# Patient Record
Sex: FEMALE | Race: WHITE | NOT HISPANIC OR LATINO | Employment: OTHER
[De-identification: names, ages, dates, MRNs, and addresses within clinical notes are randomized per-mention and may not be internally consistent; named-entity substitution may affect disease eponyms.]

---

## 2022-10-04 ENCOUNTER — TRANSCRIBE ORDERS (OUTPATIENT)
Dept: OTHER | Age: 72
End: 2022-10-04

## 2022-10-04 DIAGNOSIS — J39.8 TRACHEOBRONCHOMALACIA: Primary | ICD-10-CM

## 2022-10-14 ENCOUNTER — OFFICE VISIT (OUTPATIENT)
Dept: PULMONOLOGY | Facility: OTHER | Age: 72
End: 2022-10-14
Payer: COMMERCIAL

## 2022-10-14 ENCOUNTER — PREP FOR PROCEDURE (OUTPATIENT)
Dept: MULTI SPECIALTY CLINIC | Facility: CLINIC | Age: 72
End: 2022-10-14

## 2022-10-14 VITALS
WEIGHT: 210 LBS | DIASTOLIC BLOOD PRESSURE: 80 MMHG | BODY MASS INDEX: 35.85 KG/M2 | OXYGEN SATURATION: 97 % | SYSTOLIC BLOOD PRESSURE: 120 MMHG | HEART RATE: 101 BPM | HEIGHT: 64 IN

## 2022-10-14 DIAGNOSIS — J20.8 ACUTE BRONCHITIS DUE TO OTHER SPECIFIED ORGANISMS: ICD-10-CM

## 2022-10-14 DIAGNOSIS — R13.14 PHARYNGOESOPHAGEAL DYSPHAGIA: ICD-10-CM

## 2022-10-14 DIAGNOSIS — J39.8 TRACHEOBRONCHOMALACIA: Primary | ICD-10-CM

## 2022-10-14 PROCEDURE — 99205 OFFICE O/P NEW HI 60 MIN: CPT | Performed by: INTERNAL MEDICINE

## 2022-10-14 RX ORDER — IPRATROPIUM BROMIDE 42 UG/1
SPRAY, METERED NASAL
COMMUNITY
Start: 2021-09-07

## 2022-10-14 RX ORDER — PREDNISONE 20 MG/1
20 TABLET ORAL DAILY
Qty: 7 TABLET | Refills: 1 | Status: SHIPPED | OUTPATIENT
Start: 2022-10-14 | End: 2022-10-14

## 2022-10-14 RX ORDER — DULOXETIN HYDROCHLORIDE 30 MG/1
CAPSULE, DELAYED RELEASE ORAL
COMMUNITY
Start: 2022-07-06

## 2022-10-14 RX ORDER — SIMVASTATIN 40 MG
TABLET ORAL
COMMUNITY
Start: 2022-09-09

## 2022-10-14 RX ORDER — LISINOPRIL 10 MG/1
10 TABLET ORAL DAILY
COMMUNITY
Start: 2022-02-07

## 2022-10-14 RX ORDER — LIDOCAINE 40 MG/G
CREAM TOPICAL
Status: CANCELLED | OUTPATIENT
Start: 2022-10-14

## 2022-10-14 RX ORDER — APIXABAN 5 MG/1
5 TABLET, FILM COATED ORAL 2 TIMES DAILY
COMMUNITY
Start: 2022-09-16

## 2022-10-14 RX ORDER — ALBUTEROL SULFATE 90 UG/1
2 AEROSOL, METERED RESPIRATORY (INHALATION)
COMMUNITY
Start: 2022-03-02

## 2022-10-14 RX ORDER — LEVOTHYROXINE SODIUM 175 UG/1
TABLET ORAL
COMMUNITY
Start: 2022-10-13

## 2022-10-14 RX ORDER — FLUTICASONE PROPIONATE AND SALMETEROL XINAFOATE 115; 21 UG/1; UG/1
AEROSOL, METERED RESPIRATORY (INHALATION)
COMMUNITY
Start: 2022-06-02 | End: 2022-12-28

## 2022-10-14 RX ORDER — DULOXETIN HYDROCHLORIDE 60 MG/1
CAPSULE, DELAYED RELEASE ORAL
COMMUNITY
Start: 2022-09-16

## 2022-10-14 RX ORDER — SULFAMETHOXAZOLE/TRIMETHOPRIM 800-160 MG
TABLET ORAL
COMMUNITY
Start: 2022-03-01

## 2022-10-14 RX ORDER — FLUTICASONE PROPIONATE AND SALMETEROL XINAFOATE 230; 21 UG/1; UG/1
AEROSOL, METERED RESPIRATORY (INHALATION)
COMMUNITY
Start: 2022-04-08 | End: 2022-12-28

## 2022-10-14 RX ORDER — ALBUTEROL SULFATE 90 UG/1
AEROSOL, METERED RESPIRATORY (INHALATION)
COMMUNITY
Start: 2022-04-08

## 2022-10-14 RX ORDER — CIPROFLOXACIN 500 MG/1
500 TABLET, FILM COATED ORAL 2 TIMES DAILY
COMMUNITY
Start: 2022-09-25

## 2022-10-14 RX ORDER — LEVOTHYROXINE SODIUM 150 UG/1
TABLET ORAL
COMMUNITY
Start: 2022-09-13

## 2022-10-14 RX ORDER — ESTRADIOL 0.1 MG/G
CREAM VAGINAL
COMMUNITY
Start: 2022-01-06

## 2022-10-14 RX ORDER — COVID-19 MOLECULAR TEST ASSAY
KIT MISCELLANEOUS
COMMUNITY
Start: 2022-07-27

## 2022-10-14 RX ORDER — METOPROLOL TARTRATE 25 MG/1
25 TABLET, FILM COATED ORAL 2 TIMES DAILY
COMMUNITY
Start: 2022-09-21

## 2022-10-14 RX ORDER — DULOXETIN HYDROCHLORIDE 60 MG/1
60 CAPSULE, DELAYED RELEASE ORAL
COMMUNITY
Start: 2021-10-22 | End: 2022-10-22

## 2022-10-14 RX ORDER — LEVOTHYROXINE SODIUM 137 UG/1
TABLET ORAL
COMMUNITY
Start: 2022-08-11

## 2022-10-14 RX ORDER — CEFDINIR 300 MG/1
CAPSULE ORAL
COMMUNITY
Start: 2022-03-24

## 2022-10-14 RX ORDER — DULOXETIN HYDROCHLORIDE 30 MG/1
30 CAPSULE, DELAYED RELEASE ORAL
COMMUNITY
Start: 2021-10-22 | End: 2022-10-22

## 2022-10-14 RX ORDER — LEVOTHYROXINE SODIUM 175 UG/1
112 TABLET ORAL
COMMUNITY
Start: 2022-10-12 | End: 2023-10-12

## 2022-10-14 RX ORDER — PREDNISONE 20 MG/1
20 TABLET ORAL DAILY
Qty: 7 TABLET | Refills: 3 | Status: SHIPPED | OUTPATIENT
Start: 2022-10-14

## 2022-10-14 RX ORDER — IPRATROPIUM BROMIDE AND ALBUTEROL SULFATE 2.5; .5 MG/3ML; MG/3ML
SOLUTION RESPIRATORY (INHALATION)
COMMUNITY
Start: 2022-05-29

## 2022-10-14 NOTE — PATIENT INSTRUCTIONS
We will give you a short course of prednisone.    I recommend you discuss diuretics with your cardiologist or primary care doctor.    We will set you up for a bronchoscopy to inspect your airways.    [phone] is a number to call with any questions.

## 2022-10-14 NOTE — PROGRESS NOTES
LUNG NODULE & INTERVENTIONAL PULMONARY CLINIC  CLINICS & SURGERY CENTER, [facility]     Ana Garland MRN# [id number]   Age: 72 year old YOB: 1950       Requesting Physician: No referring provider defined for this encounter.       Assessment and Plan:    1.  Shortness of breath with high likelihood of tracheobronchomalacia.  Treat comorbidities as below.  Inspection bronchoscopy.  Will discuss repeat dynamic CT with thoracic surgery.    Continue acid reflux medications.    Video swallow evaluation for dysphagia.      We will reevaluate vocal cords during bronchoscopy.      More than 60 minutes spent on this visit during this calendar day.  This includes an extended amount of time in face-to-face discussion with the patient and her family, and also includes discussion with thoracic surgery, review of outside records and documentation.           History:     Ana Garland is a 72 year old female with sig h/o for hip arthritis, reflux who is here for evaluation/followup of shortness of breath and possible tracheobronchomalacia.  She does not have a history of bronchitis or COPD or asthma in the past.  She began having more issues in the last 6 to 12 months.  She saw an asthma specialist who did a dynamic CT which found some narrowing.  She is here to discuss treatment.    She has obstructive sleep apnea and wears her CPAP.  She tolerates this well and is expanding into use in the daytime.    She has significant hip arthritis that limits her activity.    She has some reflux for which she takes omeprazole.  She has occasional but difficult to predict pharyngeal esophageal phase dysphagia.  She occasionally has to retch food up that gets stuck.  It seems to be up in the neck and not down in the chest.    She does not have a lot of postnasal drip although she does have some voice hoarseness and inspiratory stridor occasionally.  She had an ENT laryngoscopy in  the last month or so that was normal.      - My interpretation of the images relevant for this visit includes: Seemingly some narrowing of the trachea but not in the most typical tracheobronchomalacia pattern.    - My interpretation of the PFT's relevant for this visit includes: Nondiagnostic-unremarkable           Past Medical History:   Hip arthritis  Shortness of breath       Past Surgical History:    No past surgical history on file.       Social History:     Social History     Tobacco Use     Smoking status: Former     Types: Cigarettes     Start date: 9/3/1968     Quit date: 10/23/1990     Years since quittin.9     Smokeless tobacco: Never   Substance Use Topics     Alcohol use: Not on file          Family History:   No family history on file.        Allergies:    No Known Allergies       Medications:     Current Outpatient Medications   Medication Sig     ADVAIR -21 MCG/ACT inhaler INHALE 2 PUFFS BY MOUTH TWICE DAILY RINSE MOUTH AFTER USE TO REDUCE AFTERTASTE AND INCIDENCE OF CANDIDIASIS. DO NOT SWALLOW.     ADVAIR -21 MCG/ACT inhaler INHALE 2 PUFFS BY MOUTH TWICE DAILY RINSE MOUTH WITH WATER AFTER USE TO REDUCE AFTERTASTE AND INCIDENCE OF CANDIDIASIS. DO NOT SWALLOW     albuterol (PROAIR HFA/PROVENTIL HFA/VENTOLIN HFA) 108 (90 Base) MCG/ACT inhaler Inhale 2 puffs into the lungs     albuterol (PROAIR HFA/PROVENTIL HFA/VENTOLIN HFA) 108 (90 Base) MCG/ACT inhaler INHALE 2 PUFFS BY MOUTH EVERY 4 HOURS AS NEEDED FOR SHORTNESS OF BREATH OR WHEEZING     apixaban ANTICOAGULANT (ELIQUIS) 5 MG tablet Take 5 mg by mouth     BINAXNOW COVID-19 AG HOME TEST KIT TEST AS DIRECTED TODAY     cefdinir (OMNICEF) 300 MG capsule TAKE 1 CAPSULE BY MOUTH TWICE DAILY FOR 7 DAYS     cholecalciferol (VITAMIN D3) 10 mcg (400 units) TABS tablet Take 800 Units by mouth     ciprofloxacin (CIPRO) 500 MG tablet Take 500 mg by mouth 2 times daily     DULoxetine (CYMBALTA) 30 MG capsule Take 30 mg by mouth     DULoxetine  "(CYMBALTA) 30 MG capsule TAKE 1 CAPSULE BY MOUTH TWICE DAILY . TAKE A 30 MG CAPSULE IN THE MORNING IN ADDITION TO A 60 MG CAPSULE IN THE EVENING.     DULoxetine (CYMBALTA) 60 MG capsule Take 60 mg by mouth     DULoxetine (CYMBALTA) 60 MG capsule TAKE 1 CAPSULE BY MOUTH ONCE DAILY IN THE EVENING     ELIQUIS ANTICOAGULANT 5 MG tablet Take 5 mg by mouth 2 times daily     estradiol (ESTRACE) 0.1 MG/GM vaginal cream INSERT 1 GRAM VAGINALLY 2 TIMES A WEEK     EUTHYROX 137 MCG tablet TAKE 1 TABLET BY MOUTH IN THE MORNING BEFORE BREAKFAST     EUTHYROX 150 MCG tablet      ipratropium (ATROVENT) 0.06 % nasal spray USE 2 SPRAY(S) IN EACH NOSTRIL TWICE DAILY     ipratropium - albuterol 0.5 mg/2.5 mg/3 mL (DUONEB) 0.5-2.5 (3) MG/3ML neb solution USE 3 ML IN NEBULIZER 4 TIMES DAILY AS NEEDED FOR WHEEZING FOR SHORTNESS OF BREATH     levothyroxine (SYNTHROID/LEVOTHROID) 175 MCG tablet Take 175 mcg by mouth     levothyroxine (SYNTHROID/LEVOTHROID) 175 MCG tablet      lisinopril (ZESTRIL) 10 MG tablet Take 10 mg by mouth daily     metoprolol tartrate (LOPRESSOR) 25 MG tablet Take 25 mg by mouth 2 times daily     omeprazole (PRILOSEC) 20 MG DR capsule Take 20 mg by mouth daily     predniSONE (DELTASONE) 20 MG tablet Take 1 tablet (20 mg) by mouth daily     simvastatin (ZOCOR) 40 MG tablet      sulfamethoxazole-trimethoprim (BACTRIM DS) 800-160 MG tablet TAKE 1 TABLET BY MOUTH EVERY 12 HOURS FOR 3 DAYS     No current facility-administered medications for this visit.          Review of Systems:     See HPI         Physical Exam:   /80 (BP Location: Right arm)   Pulse 101   Ht 1.626 m (5' 4\")   Wt 95.3 kg (210 lb)   SpO2 97%   BMI 36.05 kg/m      Constitutional - looks well, in no appar tired appearing but in good spirits ent distress  Eyes - no redness or discharge  Respiratory -increased work of breathing, inspiratory and some expiratory breath sounds, hoarse voice  Cardiac -- Normal rate, rhythm.   Skin - No appreciable " discoloration or lesions (very limited exam)  Neurological - No apparent tremors. Speech fluent and articlate  Psychiatric - no signs of delirium or anxiety          Current Laboratory Data:   All laboratory and imaging data reviewed.    No results found for this or any previous visit (from the past 24 hour(s)).

## 2022-10-19 ENCOUNTER — TELEPHONE (OUTPATIENT)
Dept: PULMONOLOGY | Facility: CLINIC | Age: 72
End: 2022-10-19

## 2022-10-19 NOTE — TELEPHONE ENCOUNTER
Spoke with daughter to schedule procedure with Dr. Bharati Ferrell   Procedure was scheduled on 11/4 at [facility]   Patient will have H&P with: not needed    Patient is aware a COVID-19 test is needed before their procedure.   Patient will have a home test due to outpatient status    Patient is aware a / is needed day of surgery.   Surgery letter was sent via e-mail, patient has my direct contact information for any further questions.

## 2022-10-23 ENCOUNTER — PATIENT OUTREACH (OUTPATIENT)
Dept: ONCOLOGY | Facility: CLINIC | Age: 72
End: 2022-10-23

## 2022-10-24 NOTE — PROGRESS NOTES
Late entry from 10/19/2022:  Received message from Ashley, surgery scheduler with Dr Ferrell stating pt scheduled for Bronchoscopy on 11/4/2022 at [facility] and pt's daughter Mary has questions regarding medication. Writer notified pt as consent to communicate not listed in chart. Received verbal okay from pt for writer to contact daughter Mary to discuss appointments and medical care.  Called Mary and she states pt is on a blood thinner, Eliquis for A Fib and would like to know if pt has to HOLD this medication prior to the procedure.    Dr Ferrell notified, see recommendations as noted below:    Bharati Ferrell MD Crookes, Valerie, RN  Cc: Aviva José RN  2 full days prior to the procedure please (last dose Tuesday before Friday procedure)    Daughter Mary called back and instructed as noted per Dr Ferrell about when to hold Eliquis and daughter was able to read back instructions.  Will need to complete  Bronchoscopy education and daughter states she is having surgery early next week but is able to review instructions on Wednesday, 10/26. Mary asked if writer can contact her first and then she will conference pt in on the call.  Mary states pt has difficulty recalling information thus easier for daughter to be a part of all calls. Informed daughter that FAWAD Chicas CC or writer will call back with instructions on 10/26/2022 and Mary verbalized understanding of all instructions.     Will notify FAWAD Chicas Care Coordinator with update.  Will pre procedure H&P be needed?    Debra Roman RN, BSN, OCN

## 2022-10-25 NOTE — PROGRESS NOTES
Writer called and discussed pre-bronchoscopy education with Ana and Mary (daughter) for pt's upcoming bronch with Dr. Ferrell 11/4 at [facility]. Writer reviewed the following instructions with Ana and Mary.    Pre-op Bronchoscopy Instructions    You have been scheduled on for BRONCHOSCOPY, inspection with possible endobronchial biopsy  with Dr. Ferrell at [facility] surgery center in [place]. Your procedure is scheduled on Friday November 7, 2022 with an OR start time of 12 pm. Please arrive to [facility] at 10 am (2 hours before OR start time). You will receive a call from the pre-admissions nurse the day before your procedure to confirm your arrival time.    [facility] is located at:   [address] [place] [address]    You can enter through the Surgery entrance of [facility] which is around the left side of the building if you are looking at the Main Entrance. You will see a sign for Surgery on the outside of the building. There is a surface parking lot that is free parking.    Eating and Drinking Guidelines  Discontinue all food and milk products 8 hours prior to the ARRIVAL time. You can continue clear liquids up until 2 hour prior to the ARRIVAL time. Clear liquids include water, 7Up, black coffee with no creamer/sugar, apple juice.    Medication Instructions    If you take aspirin you need to stop 7 days prior. If you are on a different blood thinner you may need to stop it up to a week before your exam. Your doctor will explain the best way to do this. Per Dr. Ferrell, Ana should stop her Eliquis 2 days prior to the procedure.    Stop taking Advil (ibuprofen), Aleve, NSAIDs 5 days before your procedure. Ok to take Tylenol    If you have diabetes, do not take insulin or other diabetes medications on the morning of the procedure (unless your doctor tells you to).     Pre-procedure COVID test  For same day procedures now [facility] is allowing patients to do a take  home COVID test 1-2 days before the procedure. If you are positive please call the pre-admission nurses right away to notify them of your results - [phone]; and do not come to your procedure. If you are negative please take a picture of the results and bring that picture with you to the hospital.      is required - You will need to arrange for a responsible caregiver to drive you home afterwards and stay with you for 24 hours. Mary (daughter) reports she and Ana's granddaughter will be able to drive Ana afterwards and will be staying with her.      Overview of the bronch    The bronch will take approximately one hour. Samples will be sent for testing at the pathology lab. The results take approximately one to two weeks to finalize.      After the procedure    You may feel tired or dizzy after the procedure. When you feel ready, slowly return to your regular activities.     You will need a responsible caregiver to drive you home after the procedure and stay with you for 24 hours afterwards. You will not be allowed to take public transportation (taxi, Uber, bus) unless you are accompanied with a responsible caregiver.    Restart all of your previous medications as directed by your doctor      24 Hour Restrictions after Bronchoscopy (due to the side effects of sedation)    Do not drive or operate any machinery    Do not drink alcohol    Do not make important decisions or sign contracts/legal documents.       Common side effects after bronch include:     Low grade fever in the evening after your bronchscopy    If you develop a fever higher than 100.4 degrees you may take Tylenol. If your fever lasts more than 24 hours, call your doctor.    Sore throat - take throat lozenges as needed    Drink plenty of fluids    Rest your voice for a day    Smoking may increase throat irritation    Coughing   o Small amounts of phlegm are common for 24 hours after the procedure  o Sometimes increased coughing occurring  beyond 24 hours after the procedure can happen      Side effects requiring notification of your doctor's team    IV site redness or drainage    Bleeding (more than a teaspoon) from your nose or throat    Pain    Fever    Difficulty or inability to resume eating or drinking    Ongoing cough      Emergency Precautions  Go to the nearest ED if you have any of the following and be sure to tell them you had a bronch:     Difficulty breathing    Chest pain or discomfort    Severe throat pain or neck swelling    Severe dizziness, weakness, or a fast heart rate    Large amount of bleeding from your nose or throat    If you have any questions or concerns about this procedure, please call Dr. Ferrell's clinic at [phone]    Mary and Ana verbalized understanding and procedure instructions were emailed to them to the email on file.     BENNIE Olivo, JAKN, RN, LAc, OCN  RN Care Coordinator  [facility]  Ph: [phone]  F: [phone]

## 2022-11-01 ENCOUNTER — HOSPITAL ENCOUNTER (OUTPATIENT)
Dept: SPEECH THERAPY | Facility: CLINIC | Age: 72
Discharge: HOME | End: 2022-11-01
Attending: INTERNAL MEDICINE
Payer: COMMERCIAL

## 2022-11-01 ENCOUNTER — HOSPITAL ENCOUNTER (OUTPATIENT)
Dept: GENERAL RADIOLOGY | Facility: CLINIC | Age: 72
Discharge: HOME | End: 2022-11-01
Attending: INTERNAL MEDICINE
Payer: COMMERCIAL

## 2022-11-01 DIAGNOSIS — R13.14 PHARYNGOESOPHAGEAL DYSPHAGIA: ICD-10-CM

## 2022-11-01 PROCEDURE — 74230 X-RAY XM SWLNG FUNCJ C+: CPT

## 2022-11-01 PROCEDURE — 92611 MOTION FLUOROSCOPY/SWALLOW: CPT | Mod: GN

## 2022-11-01 RX ORDER — BARIUM SULFATE 400 MG/ML
SUSPENSION ORAL ONCE
Status: DISCONTINUED | OUTPATIENT
Start: 2022-11-01 | End: 2022-11-01 | Stop reason: CLARIF

## 2022-11-01 NOTE — PROGRESS NOTES
"Video Fluoroscopic Swallow Study (VFSS)     11/01/22 1044   General Information   Type Of Visit Initial   Start Of Care Date 11/01/22   Referring Physician Tre Decker MD   Orders Evaluate And Treat   Medical Diagnosis pharyngoesophageal dysphagia (R13.14)   Onset Of Illness/injury Or Date Of Surgery 10/17/22  (order date)   Pertinent History of Current Problem/OT: Additional Occupational Profile Info   Referral from Dr. Dceker from [facility] for a video fluoroscopic swallow study given pt reports of food sticking/ retching it back up. Pt reports this has occured 4-5x within the last 6 months and has occured with hamburger/bun and shrimp fried rice. During these instances water/liquid does not help push it down and she forces herself to cough/retch it back up. She is unsure if she feels like liquids go down the wrong pipe when asked, if they do it is not often. She did have a VFSS on 12/4/2012 per care everywhere - radiologist's report states flash laryngeal penetration with thin liquids, WFL swallow with solids.     Per referring provider: She has persistent SOB and a recent dynamic CT which found some narrowing of her trachea. \"She does not have a lot of postnasal drip although she does have some voice hoarseness and inspiratory stridor occasionally.  She had an ENT laryngoscopy in the last month or so that was normal.\" He thinks \"high liklihood of tracheobronchomalacia\" and she has a brochoscopy appointment for this Friday, 11/4/22, for further assessment.    Her PMHx includes: asthma, COPD, GERD (takes omeprozole which does reduce her reflux + belching symptoms per her report).      Respiratory Status Room air   Prior Level Of Function Swallowing   Prior Level Of Function Comment Regular/thin diet   General Observations Pt in wheelchair, SOB and wheezing/stridor noted, reduced respiratory/speech coordination, hoarse vocal quality   Patient/family " Goals To fix her voice and get better   Abuse Screen (yes response referral indicated)   Feels Unsafe at Home or Work/School no   Feels Threatened by Someone no   Does Anyone Try to Keep You From Having Contact with Others or Doing Things Outside Your Home? no   Physical Signs of Abuse Present no   VFSS Evaluation   VFSS Additional Documentation Yes   VFSS Eval: Radiology   Radiologist Dr. Burnette   Views Taken left lateral   Physical Location of Procedure [facility], Radiology Dept, Fluoroscopy Suite   VFSS Eval: Thin Liquid Texture Trial   Mode of Presentation, Thin Liquid cup;straw;self-fed   Order of Presentation 1, 2, 6   Preparatory Phase Poor bolus control   Oral Phase, Thin Liquid Premature pharyngeal entry   Pharyngeal Phase, Thin Liquid Delayed swallow reflex   Rosenbek's Penetration Aspiration Scale: Thin Liquid Trial Results 2 - contrast enters airway, remains above the vocal cords, no residue remains (penetration)   Diagnostic Statement trace before/during flash laryngeal penetration 2/2 premature bolus spillage to the pyriform sinuses & mild swallow delay/delay in laryngeal closure (though epiglottic inversion complete).   VFSS Eval: Puree Solid Texture Trial   Mode of Presentation, Puree spoon;self-fed   Order of Presentation 3   Preparatory Phase WFL   Oral Phase, Puree WFL   Pharyngeal Phase, Puree WFL   Rosenbek's Penetration Aspiration Scale: Puree Food Trial Results 1 - no aspiration, contrast does not enter airway   Diagnostic Statement WFL, no residuals   VFSS Eval: Regular Texture Trial (Solid)   Mode of Presentation self-fed   Order of Presentation 4, 5   Preparatory Phase WFL   Oral Phase WFL   Pharyngeal Phase WFL   Rosenbek's Penetration Aspiration Scale 1 - no aspiration, contrast does not enter airway   Diagnostic Statement WFL, no residuals   Esophageal Phase of Swallow   Patient reports or presents with symptoms of esophageal dysphagia Yes    Esophageal sweep performed during today s vidofluoroscopic exam  No   Esophageal comments Pt with significant SOB, difficulty with movement per her report therefore unable to complete esophageal sweep. Notable prominent cricopharyngeus (see image), which did not impede bolus transit into upper esophagus.   Swallow Eval: Clinical Impressions   Skilled Criteria for Therapy Intervention   (Evaluation only)   Functional Assessment Scale (FAS) 6   Dysphagia Outcome Severity Scale (CHAO) Level 6 - CHAO   Treatment Diagnosis minimal oropharyngeal dysphagia   Diet texture recommendations Regular diet;Thin liquids (level 0)   Demonstrates Need for Referral to Another Service   (?GI vs esophageal motility testing (e.g., esophagram?))   Anticipated Discharge Disposition home   Patient, family and/or staff in agreement with Plan of Care Yes   Clinical Impression Comments   Video fluoroscopic swallow study completed - pt currently presents with minimal oropharyngeal dysphagia with primary deficits appearing to be related to reduced swallow/respiratory coordination (very SOB after swallowing with baseline SOB/wheezing/stridor) and mild swallow delay.     Reduced oral control with thin liquids resulting in premature bolus spillage to the pyriform sinuses with thin liquids with mild delay; timely swallow initiation at the base of tongue with all solids. Piecemeal swallows with liquids. Base of tongue retraction minimally reduced, hyolaryngeal elevation/excursion WFL, epiglottic inversion complete. Upper esophageal sphincter relaxation/opening during the swallow appears function despite notable prominent cricopharyngeus (see image) -- no difficulty of bolus transfer into upper esophagus/ no pharyngeal residuals.         Trace before/during flash laryngeal penetration occuring with thin liquids only 2/2 premature bolus spillage to the pyriform sinuses & mild swallow delay/delay in laryngeal closure - this is c/w report dating back  to 2012 and flash laryngeal penetration is judged functional. No aspiration noted.     Education pt on results of VFSS including cine clip video review of oropharyngeal anatomy/physiology, current diet recommendations, strategies to improve swallow/respiratory coordination - pt verbalized understanding.    Recommendations:  1) regular solids, thin liquids with SLOW rate of intake, frequent rest breaks between bites/sips  2) consider further esophageal motility testing vs GI consult given symptoms of food sticking/retching up food, hx of reflux and prominent cricopharyngeus.   3) consider OP SLP for video stroboscopy assessment with potential voice intervention     Total Session Time   SLP Eval: VideoFluoroscopic Swallow function Minutes ([id number]) 88

## 2022-11-04 ENCOUNTER — HOSPITAL ENCOUNTER (OUTPATIENT)
Facility: CLINIC | Age: 72
Discharge: HOME | End: 2022-11-04
Attending: INTERNAL MEDICINE | Admitting: INTERNAL MEDICINE
Payer: COMMERCIAL

## 2022-11-04 VITALS
RESPIRATION RATE: 15 BRPM | OXYGEN SATURATION: 100 % | HEART RATE: 71 BPM | SYSTOLIC BLOOD PRESSURE: 88 MMHG | DIASTOLIC BLOOD PRESSURE: 76 MMHG | TEMPERATURE: 97.2 F

## 2022-11-04 DIAGNOSIS — J39.8 TRACHEOBRONCHOMALACIA: ICD-10-CM

## 2022-11-04 PROCEDURE — 31622 DX BRONCHOSCOPE/WASH: CPT | Performed by: INTERNAL MEDICINE

## 2022-11-04 PROCEDURE — 250N000011 HC RX IP 250 OP 636: Performed by: INTERNAL MEDICINE

## 2022-11-04 PROCEDURE — 999N000099 HC STATISTIC MODERATE SEDATION < 10 MIN: Performed by: INTERNAL MEDICINE

## 2022-11-04 PROCEDURE — 250N000009 HC RX 250: Performed by: INTERNAL MEDICINE

## 2022-11-04 RX ORDER — NALOXONE HYDROCHLORIDE 0.4 MG/ML
0.4 INJECTION, SOLUTION INTRAMUSCULAR; INTRAVENOUS; SUBCUTANEOUS
Status: DISCONTINUED | OUTPATIENT
Start: 2022-11-04 | End: 2022-11-04 | Stop reason: HOSPADM

## 2022-11-04 RX ORDER — NALOXONE HYDROCHLORIDE 0.4 MG/ML
0.2 INJECTION, SOLUTION INTRAMUSCULAR; INTRAVENOUS; SUBCUTANEOUS
Status: DISCONTINUED | OUTPATIENT
Start: 2022-11-04 | End: 2022-11-04 | Stop reason: HOSPADM

## 2022-11-04 RX ORDER — LIDOCAINE 40 MG/G
CREAM TOPICAL
Status: DISCONTINUED | OUTPATIENT
Start: 2022-11-04 | End: 2022-11-04 | Stop reason: HOSPADM

## 2022-11-04 RX ORDER — ALBUTEROL SULFATE 0.83 MG/ML
SOLUTION RESPIRATORY (INHALATION) PRN
Status: DISCONTINUED | OUTPATIENT
Start: 2022-11-04 | End: 2022-11-04 | Stop reason: HOSPADM

## 2022-11-04 RX ORDER — FENTANYL CITRATE 0.05 MG/ML
50-100 INJECTION, SOLUTION INTRAMUSCULAR; INTRAVENOUS EVERY 5 MIN PRN
Status: DISCONTINUED | OUTPATIENT
Start: 2022-11-04 | End: 2022-11-04 | Stop reason: HOSPADM

## 2022-11-04 RX ORDER — LIDOCAINE HYDROCHLORIDE 20 MG/ML
INJECTION, SOLUTION INFILTRATION; PERINEURAL PRN
Status: DISCONTINUED | OUTPATIENT
Start: 2022-11-04 | End: 2022-11-04 | Stop reason: HOSPADM

## 2022-11-04 RX ORDER — FLUMAZENIL 0.1 MG/ML
0.2 INJECTION, SOLUTION INTRAVENOUS
Status: DISCONTINUED | OUTPATIENT
Start: 2022-11-04 | End: 2022-11-04 | Stop reason: HOSPADM

## 2022-11-04 RX ORDER — ONDANSETRON 4 MG/1
4 TABLET, ORALLY DISINTEGRATING ORAL EVERY 6 HOURS PRN
Status: DISCONTINUED | OUTPATIENT
Start: 2022-11-04 | End: 2022-11-04 | Stop reason: HOSPADM

## 2022-11-04 RX ORDER — ONDANSETRON 2 MG/ML
4 INJECTION INTRAMUSCULAR; INTRAVENOUS EVERY 6 HOURS PRN
Status: DISCONTINUED | OUTPATIENT
Start: 2022-11-04 | End: 2022-11-04 | Stop reason: HOSPADM

## 2022-11-04 RX ORDER — LIDOCAINE HYDROCHLORIDE 40 MG/ML
INJECTION, SOLUTION RETROBULBAR PRN
Status: DISCONTINUED | OUTPATIENT
Start: 2022-11-04 | End: 2022-11-04 | Stop reason: HOSPADM

## 2022-11-04 RX ORDER — LIDOCAINE HYDROCHLORIDE 10 MG/ML
INJECTION, SOLUTION INFILTRATION; PERINEURAL PRN
Status: DISCONTINUED | OUTPATIENT
Start: 2022-11-04 | End: 2022-11-04 | Stop reason: HOSPADM

## 2022-11-04 RX ADMIN — MIDAZOLAM HYDROCHLORIDE 1 MG: 1 INJECTION, SOLUTION INTRAMUSCULAR; INTRAVENOUS at 11:35

## 2022-11-04 RX ADMIN — FENTANYL CITRATE 50 MCG: 50 INJECTION INTRAMUSCULAR; INTRAVENOUS at 11:35

## 2022-11-04 ASSESSMENT — ACTIVITIES OF DAILY LIVING (ADL)
ADLS_ACUITY_SCORE: 35
ADLS_ACUITY_SCORE: 35

## 2022-11-04 NOTE — PROCEDURES
INTERVENTIONAL PULMONOLOGY       Procedure(s):    A flexible bronchoscopy  Airway exam    Indication:  Suspected tracheobronchomalacia    Attending of Record:  Bharati Ferrell MD     Interventional Pulmonary Fellow   None    Trainees Present:   None     Medications:    9 ml 4% lidocaine  6 ml 1% lidocaine  1 mg versed  50 mcg fentanyl    Sedation Time:   Total sedation time was Choose Duration: 9 minutes of continuous bedside 1:1 monitoring.    Time Out:  Performed    The patient's medical record has been reviewed.  The indication for the procedure was reviewed.  The necessary history and physical examination was performed and reviewed.  The risks, benefits and alternatives of the procedure were discussed with the the patient in detail and she had the opportunity to ask questions.  I discussed in particular the potential complications including risks of minor or life-threatening bleeding and/or infection, respiratory failure, vocal cord trauma / paralysis, pneumothorax, and discomfort. Sedation risks were also discussed including abnormal heart rhythms, low blood pressure, and respiratory failure. All questions were answered to the best of my ability.  Verbal and written informed consent was obtained.  The proposed procedure and the patient's identification were verified prior to the procedure by the physician and the nurse.    The patient was assessed for the adequacy for the procedure and to receive medications.   Mental Status:  Alert and oriented x 3  Airway examination:  Class IV (Soft palate is not visual, hard palate only)  Pulmonary:  Diffuse wheezing  CV:  RRR, no murmurs or gallops  ASA Grade:  (II)  Mild systemic disease    After clinical evaluation and reviewing the indication, risks, alternatives and benefits of the procedure the patient was deemed to be in satisfactory condition to undergo the procedure.      Immediately before administration of medications the patient was re-assessed for adequacy to  receive sedatives including the heart rate, respiratory rate, mental status, oxygen saturation, blood pressure and adequacy of pulmonary ventilation. These same parameters were continuously monitored throughout the procedure.    A Tuberculosis risk assessment was performed:  The patient has no known RISK of Tuberculosis    The procedure was performed in a negative airflow room: The patient could not be moved to a negative airflow room because of no risk of TB    Maneuvers / Procedure:      A Flexible  bronchoscope was used for the procedure. The rigid bronchoscope was inserted into the left nostril. Uvula, epiglottis and vocal cords were seen. The scope was advanced turning the bevel to 90degress while passing through the cords and into the trachea.   Airway Examination: A complete airway examination was performed from the distal trachea to the subsegmental level in each lobe of both lungs.  Pertinent findings include saber trachea with mild to moderate EDAC, some lateral contraction with breathing, 30% or less obstruction.     No TBM    Any disposable equipment was visually inspected and deemed to be intact immediately post procedure.      Relevant Pictures  Video obtained see this link: [email or web address]       Recommendations:     -->  Successful bronchoscopy with airway exam.   -->  Some excessive dynamic airway collapse seen, not TBM  --> I shared the results with her granddaughter Yvonne Ferrell MD  Associate Professor of Medicine  Section of Interventional Pulmonology   Division of Pulmonary, Allergy, Critical Care and Sleep Medicine   [facility]

## 2022-11-11 ENCOUNTER — TELEPHONE (OUTPATIENT)
Dept: PULMONOLOGY | Facility: CLINIC | Age: 72
End: 2022-11-11

## 2022-11-11 NOTE — TELEPHONE ENCOUNTER
Test Results    Contacts       Type Contact Phone/Fax    11/11/2022 09:22 AM CST Phone (Incoming) Mary Gee (Emergency Contact) [phone]          Who ordered the test:  Dr. Decker    Type of test: Bronchoscopy    Date of test:  11/04/2022    Where was the test performed:    [facility]    What are your questions/concerns?:  Would Like Provider to call with results     Okay to leave a detailed message?: Yes at Home number on file [phone] (home)

## 2022-11-21 ENCOUNTER — PREP FOR PROCEDURE (OUTPATIENT)
Dept: MULTI SPECIALTY CLINIC | Facility: CLINIC | Age: 72
End: 2022-11-21

## 2022-11-21 DIAGNOSIS — J39.8 TRACHEOBRONCHOMALACIA: Primary | ICD-10-CM

## 2022-12-05 ENCOUNTER — TELEPHONE (OUTPATIENT)
Dept: PULMONOLOGY | Facility: CLINIC | Age: 72
End: 2022-12-05

## 2022-12-05 NOTE — TELEPHONE ENCOUNTER
Spoke with daughter to schedule procedure with Tre Decker    Procedure was scheduled on 12/19 at [facility] OR  Patient will have H&P at [facility]     Patient is aware a COVID-19 test is needed before their procedure.   Patient will have a home test due to outpatient status    Patient is aware a / is needed day of surgery.   Surgery letter was sent via [employer], patient has my direct contact information for any further questions.     Letter sent to: [email or web address]

## 2022-12-05 NOTE — TELEPHONE ENCOUNTER
Called patient to schedule procedure with Tre Decker , there was no answer.  Left message with my direct line [phone].    Sx spot holding 12/19

## 2022-12-16 ENCOUNTER — ANESTHESIA EVENT (OUTPATIENT)
Dept: SURGERY | Facility: CLINIC | Age: 72
End: 2022-12-16
Payer: COMMERCIAL

## 2022-12-16 ASSESSMENT — COPD QUESTIONNAIRES
CAT_SEVERITY: MILD
COPD: 1

## 2022-12-16 NOTE — ANESTHESIA PREPROCEDURE EVALUATION
Anesthesia Pre-Procedure Evaluation    Patient: Ana Garland   MRN: [id number] : 1950        Procedure : Procedure(s):  BRONCHOSCOPY flexible for inspection with possible rigid bronchoscopy and stent placement          Past Medical History:   Diagnosis Date     Antiplatelet or antithrombotic long-term use      Bronchomalacia      Chronic kidney disease, stage III (moderate) (H)      Hypertension      Hypothyroidism      Paroxysmal atrial fibrillation (H)      Sleep apnea     CPAP      Past Surgical History:   Procedure Laterality Date     BRONCHOSCOPY FLEXIBLE AND RIGID N/A 2022    Procedure: BRONCHOSCOPY;  Surgeon: Bharati Ferrell MD;  Location: [facility]      No Known Allergies   Social History     Tobacco Use     Smoking status: Former     Types: Cigarettes     Start date: 9/3/1968     Quit date: 10/23/1990     Years since quittin.1     Smokeless tobacco: Never   Substance Use Topics     Alcohol use: Yes     Comment: rare      Wt Readings from Last 1 Encounters:   10/14/22 95.3 kg (210 lb)        Anesthesia Evaluation            ROS/MED HX  ENT/Pulmonary: Comment: Chronic cough    (+) sleep apnea, uses CPAP, asthma Treatment: Inhaled steroids and Inhaler prn,  mild,  COPD,     Neurologic:       Cardiovascular:     (+) hypertension-----Taking blood thinners ANGELES. Previous cardiac testing   Echo: Date: 2022 Results:   1. Normal LV size, normal wall thickness, normal global systolic function with an estimated EF of 55 - 60%.    2. Mildly enlarged left atrium.    3. Right ventricular cavity size is normal, global systolic RV function is borderline reduced.    4. Mildly increased estimated pulmonary pressures by tricuspid regurgitation velocity and right atrial pressure (39 mmHg plus RAP).     Stress Test: Date: 8/3/2021 Results:  Treadmill, Chuck protocol. No evidence of ischemia or RWMA.  ECG Reviewed: Date: Results:    Cath: Date: Results:      METS/Exercise Tolerance:     Hematologic:        Musculoskeletal:       GI/Hepatic:       Renal/Genitourinary:       Endo:     (+) thyroid problem, hypothyroidism, Obesity,     Psychiatric/Substance Use:       Infectious Disease:       Malignancy:       Other:               OUTSIDE LABS:  CBC: No results found for: WBC, HGB, HCT, PLT  BMP: No results found for: NA, POTASSIUM, CHLORIDE, CO2, BUN, CR, GLC  COAGS: No results found for: PTT, INR, FIBR  POC: No results found for: BGM, HCG, HCGS  HEPATIC: No results found for: ALBUMIN, PROTTOTAL, ALT, AST, GGT, ALKPHOS, BILITOTAL, BILIDIRECT, CARLA  OTHER: No results found for: PH, LACT, A1C, EDOUARD, PHOS, MAG, LIPASE, AMYLASE, TSH, T4, T3, CRP, SED    Anesthesia Plan    ASA Status:  3      Anesthesia Type: General.     - Airway: ETT   Induction: Intravenous.   Maintenance: TIVA.        Consents            Postoperative Care    Pain management: IV analgesics, Oral pain medications, Multi-modal analgesia.   PONV prophylaxis: Ondansetron (or other 5HT-3)     Comments:                Vinod Jacinto MD

## 2022-12-19 ENCOUNTER — ANESTHESIA (OUTPATIENT)
Dept: SURGERY | Facility: CLINIC | Age: 72
End: 2022-12-19
Payer: COMMERCIAL

## 2022-12-19 ENCOUNTER — HOSPITAL ENCOUNTER (OUTPATIENT)
Facility: CLINIC | Age: 72
Discharge: HOME | End: 2022-12-19
Attending: INTERNAL MEDICINE | Admitting: INTERNAL MEDICINE
Payer: COMMERCIAL

## 2022-12-19 VITALS
SYSTOLIC BLOOD PRESSURE: 119 MMHG | HEIGHT: 64 IN | OXYGEN SATURATION: 95 % | TEMPERATURE: 97.6 F | BODY MASS INDEX: 36.37 KG/M2 | DIASTOLIC BLOOD PRESSURE: 85 MMHG | WEIGHT: 213 LBS | RESPIRATION RATE: 20 BRPM | HEART RATE: 113 BPM

## 2022-12-19 LAB — GLUCOSE BLDC GLUCOMTR-MCNC: 129 MG/DL (ref 70–99)

## 2022-12-19 PROCEDURE — 360N000083 HC SURGERY LEVEL 3 W/ FLUORO, PER MIN: Performed by: INTERNAL MEDICINE

## 2022-12-19 PROCEDURE — 82962 GLUCOSE BLOOD TEST: CPT

## 2022-12-19 PROCEDURE — 31622 DX BRONCHOSCOPE/WASH: CPT | Performed by: INTERNAL MEDICINE

## 2022-12-19 PROCEDURE — 999N000141 HC STATISTIC PRE-PROCEDURE NURSING ASSESSMENT: Performed by: INTERNAL MEDICINE

## 2022-12-19 PROCEDURE — 250N000013 HC RX MED GY IP 250 OP 250 PS 637

## 2022-12-19 PROCEDURE — 710N000010 HC RECOVERY PHASE 1, LEVEL 2, PER MIN: Performed by: INTERNAL MEDICINE

## 2022-12-19 PROCEDURE — 258N000003 HC RX IP 258 OP 636

## 2022-12-19 PROCEDURE — 250N000011 HC RX IP 250 OP 636

## 2022-12-19 PROCEDURE — 710N000012 HC RECOVERY PHASE 2, PER MINUTE: Performed by: INTERNAL MEDICINE

## 2022-12-19 PROCEDURE — 250N000009 HC RX 250: Performed by: STUDENT IN AN ORGANIZED HEALTH CARE EDUCATION/TRAINING PROGRAM

## 2022-12-19 PROCEDURE — 250N000009 HC RX 250: Performed by: ANESTHESIOLOGY

## 2022-12-19 PROCEDURE — 99152 MOD SED SAME PHYS/QHP 5/>YRS: CPT | Performed by: INTERNAL MEDICINE

## 2022-12-19 PROCEDURE — 370N000017 HC ANESTHESIA TECHNICAL FEE, PER MIN: Performed by: INTERNAL MEDICINE

## 2022-12-19 RX ORDER — FENTANYL CITRATE 50 UG/ML
INJECTION, SOLUTION INTRAMUSCULAR; INTRAVENOUS PRN
Status: DISCONTINUED | OUTPATIENT
Start: 2022-12-19 | End: 2022-12-19

## 2022-12-19 RX ORDER — LIDOCAINE 40 MG/G
CREAM TOPICAL
Status: DISCONTINUED | OUTPATIENT
Start: 2022-12-19 | End: 2022-12-19 | Stop reason: HOSPADM

## 2022-12-19 RX ORDER — ACETAMINOPHEN 325 MG/1
975 TABLET ORAL ONCE
Status: DISCONTINUED | OUTPATIENT
Start: 2022-12-19 | End: 2022-12-19 | Stop reason: HOSPADM

## 2022-12-19 RX ORDER — HYDROMORPHONE HYDROCHLORIDE 1 MG/ML
0.4 INJECTION, SOLUTION INTRAMUSCULAR; INTRAVENOUS; SUBCUTANEOUS EVERY 5 MIN PRN
Status: DISCONTINUED | OUTPATIENT
Start: 2022-12-19 | End: 2022-12-19 | Stop reason: HOSPADM

## 2022-12-19 RX ORDER — FENTANYL CITRATE 50 UG/ML
25 INJECTION, SOLUTION INTRAMUSCULAR; INTRAVENOUS EVERY 5 MIN PRN
Status: DISCONTINUED | OUTPATIENT
Start: 2022-12-19 | End: 2022-12-19 | Stop reason: HOSPADM

## 2022-12-19 RX ORDER — FENTANYL CITRATE 50 UG/ML
50 INJECTION, SOLUTION INTRAMUSCULAR; INTRAVENOUS EVERY 5 MIN PRN
Status: DISCONTINUED | OUTPATIENT
Start: 2022-12-19 | End: 2022-12-19 | Stop reason: HOSPADM

## 2022-12-19 RX ORDER — METOPROLOL TARTRATE 1 MG/ML
5 INJECTION, SOLUTION INTRAVENOUS ONCE
Status: COMPLETED | OUTPATIENT
Start: 2022-12-19 | End: 2022-12-19

## 2022-12-19 RX ORDER — SODIUM CHLORIDE, SODIUM LACTATE, POTASSIUM CHLORIDE, CALCIUM CHLORIDE 600; 310; 30; 20 MG/100ML; MG/100ML; MG/100ML; MG/100ML
INJECTION, SOLUTION INTRAVENOUS CONTINUOUS PRN
Status: DISCONTINUED | OUTPATIENT
Start: 2022-12-19 | End: 2022-12-19

## 2022-12-19 RX ORDER — GLYCOPYRROLATE 0.2 MG/ML
0.2 INJECTION, SOLUTION INTRAMUSCULAR; INTRAVENOUS ONCE
Status: COMPLETED | OUTPATIENT
Start: 2022-12-19 | End: 2022-12-19

## 2022-12-19 RX ORDER — HYDROMORPHONE HYDROCHLORIDE 1 MG/ML
0.2 INJECTION, SOLUTION INTRAMUSCULAR; INTRAVENOUS; SUBCUTANEOUS EVERY 5 MIN PRN
Status: DISCONTINUED | OUTPATIENT
Start: 2022-12-19 | End: 2022-12-19 | Stop reason: HOSPADM

## 2022-12-19 RX ORDER — DIMENHYDRINATE 50 MG/ML
25 INJECTION, SOLUTION INTRAMUSCULAR; INTRAVENOUS
Status: DISCONTINUED | OUTPATIENT
Start: 2022-12-19 | End: 2022-12-19 | Stop reason: HOSPADM

## 2022-12-19 RX ORDER — LABETALOL HYDROCHLORIDE 5 MG/ML
10 INJECTION, SOLUTION INTRAVENOUS
Status: DISCONTINUED | OUTPATIENT
Start: 2022-12-19 | End: 2022-12-19 | Stop reason: HOSPADM

## 2022-12-19 RX ORDER — SODIUM CHLORIDE, SODIUM LACTATE, POTASSIUM CHLORIDE, CALCIUM CHLORIDE 600; 310; 30; 20 MG/100ML; MG/100ML; MG/100ML; MG/100ML
INJECTION, SOLUTION INTRAVENOUS CONTINUOUS
Status: DISCONTINUED | OUTPATIENT
Start: 2022-12-19 | End: 2022-12-19 | Stop reason: HOSPADM

## 2022-12-19 RX ORDER — HYDRALAZINE HYDROCHLORIDE 20 MG/ML
2.5-5 INJECTION INTRAMUSCULAR; INTRAVENOUS EVERY 10 MIN PRN
Status: DISCONTINUED | OUTPATIENT
Start: 2022-12-19 | End: 2022-12-19 | Stop reason: HOSPADM

## 2022-12-19 RX ORDER — ACETAMINOPHEN 500 MG
1000 TABLET ORAL ONCE
Status: COMPLETED | OUTPATIENT
Start: 2022-12-19 | End: 2022-12-19

## 2022-12-19 RX ORDER — PROPOFOL 10 MG/ML
INJECTION, EMULSION INTRAVENOUS CONTINUOUS PRN
Status: DISCONTINUED | OUTPATIENT
Start: 2022-12-19 | End: 2022-12-19

## 2022-12-19 RX ADMIN — FENTANYL CITRATE 25 MCG: 50 INJECTION, SOLUTION INTRAMUSCULAR; INTRAVENOUS at 12:07

## 2022-12-19 RX ADMIN — ACETAMINOPHEN 1000 MG: 500 TABLET ORAL at 09:48

## 2022-12-19 RX ADMIN — METOPROLOL TARTRATE 5 MG: 5 INJECTION INTRAVENOUS at 13:09

## 2022-12-19 RX ADMIN — LIDOCAINE HYDROCHLORIDE 4 ML: 40 INJECTION, SOLUTION RETROBULBAR; TOPICAL at 11:35

## 2022-12-19 RX ADMIN — MIDAZOLAM 0.5 MG: 1 INJECTION INTRAMUSCULAR; INTRAVENOUS at 12:01

## 2022-12-19 RX ADMIN — GLYCOPYRROLATE 0.2 MG: 0.2 INJECTION INTRAMUSCULAR; INTRAVENOUS at 11:14

## 2022-12-19 RX ADMIN — SODIUM CHLORIDE, POTASSIUM CHLORIDE, SODIUM LACTATE AND CALCIUM CHLORIDE: 600; 310; 30; 20 INJECTION, SOLUTION INTRAVENOUS at 11:54

## 2022-12-19 RX ADMIN — PROPOFOL 25 MCG/KG/MIN: 10 INJECTION, EMULSION INTRAVENOUS at 12:01

## 2022-12-19 ASSESSMENT — ACTIVITIES OF DAILY LIVING (ADL)
ADLS_ACUITY_SCORE: 35

## 2022-12-19 NOTE — ANESTHESIA CARE TRANSFER NOTE
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    Anesthesia Type:   General     Note:    Oropharynx: oropharynx clear of all foreign objects and spontaneously breathing  Level of Consciousness: awake  Oxygen Supplementation: face mask  Level of Supplemental Oxygen (L/min / FiO2): 6  Independent Airway: airway patency satisfactory and stable  Dentition: dentition unchanged  Vital Signs Stable: post-procedure vital signs reviewed and stable  Report to RN Given: handoff report given  Patient transferred to: PACU    Handoff Report: Identifed the Patient, Identified the Reponsible Provider, Reviewed the pertinent medical history, Discussed the surgical course, Reviewed Intra-OP anesthesia mangement and issues during anesthesia, Set expectations for post-procedure period and Allowed opportunity for questions and acknowledgement of understanding      Vitals:  Vitals Value Taken Time   BP 99/88 12/19/22 1220   Temp     Pulse 121 12/19/22 1228   Resp 22 12/19/22 1228   SpO2 100 % 12/19/22 1228   Vitals shown include unvalidated device data.    Electronically Signed By: Vinod Jacinto MD  December 19, 2022  12:29 PM

## 2022-12-19 NOTE — BRIEF OP NOTE
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  .  Complications: None.  Implants: * No implants in log *

## 2022-12-19 NOTE — DISCHARGE INSTRUCTIONS
Post Bronchoscopy Patient Instructions:    December 19, 2022  Ana Garland    Your procedure was completed (bronchoscopy with inspection) without any immediate complications.    You may cough up scant amount of blood for the next 12-24 hours. If you have excessive cough with blood, chest pain, shortness of breath, please report to the closest emergency room.    You may experience low grade (less than 100.5 F) fever next 24 hours, if so, you can take Tylenol. If the fever persists more than 24 hours, please contact to our office or your primary care provider.    My clinic will contact you to arrange follow up to discuss our procedure.    You may resume your diet as it was prior to procedure.    You may resume your medications after the procedure unless you are instructed to do differently. You may resume Eliquis today.     Please follow instructions from the nursing staff upon discharge in terms of activity. In general, you should avoid any attention or motor skill requiring activities (e.g., driving or operating any motorized vehicle) for 24 hours as you might be still under the effect of sedation medications. Please make sure an adult to accompany you next 24 hours.     Should you have any question, please do not hesitate to call our office.    Tre Decker MD     [facility] [place]  Same-Day Surgery   Adult Discharge Orders & Instructions     For 24 hours after surgery    Get plenty of rest.  A responsible adult must stay with you for at least 24 hours after you leave the hospital.   Do not drive or use heavy equipment.  If you have weakness or tingling, don't drive or use heavy equipment until this feeling goes away.  Do not drink alcohol.  Avoid strenuous or risky activities.  Ask for help when climbing stairs.   You may feel lightheaded.  IF so, sit for a few minutes before standing.  Have someone help you get up.   If you have nausea (feel sick to your stomach): Drink only  clear liquids such as apple juice, ginger ale, broth or 7-Up.  Rest may also help.  Be sure to drink enough fluids.  Move to a regular diet as you feel able.  You may have a slight fever. Call the doctor if your fever is over 100 F (37.7 C) (taken under the tongue) or lasts longer than 24 hours.  You may have a dry mouth, a sore throat, muscle aches or trouble sleeping.  These should go away after 24 hours.  Do not make important or legal decisions.   Call your doctor for any of the followin.  Signs of infection (fever, growing tenderness at the surgery site, a large amount of drainage or bleeding, severe pain, foul-smelling drainage, redness, swelling).    2. It has been over 8 to 10 hours since surgery and you are still not able to urinate (pass water).    3.  Headache for over 24 hours.    4.  Numbness, tingling or weakness the day after surgery (if you had spinal anesthesia).  To contact a doctor, call __________Dr. Tre Decker's clinic at [phone] ______________________________ or:    '   [phone] and ask for the resident on call for   __________Pulmonology________ (answered 24 hours a day)  '   Emergency Department:    [facility]: [phone]       (TTY for hearing impaired: [phone])    [facility]: [phone]       (TTY for hearing impaired: [phone])

## 2022-12-19 NOTE — ANESTHESIA POSTPROCEDURE EVALUATION
Patient: Ana Garland    Procedure: Procedure(s):  BRONCHOSCOPY flexible       Anesthesia Type:  General    Note:  Disposition: Outpatient   Postop Pain Control: Uneventful            Sign Out: Well controlled pain   PONV: No   Neuro/Psych: Uneventful            Sign Out: Acceptable/Baseline neuro status   Airway/Respiratory: Uneventful            Sign Out: Acceptable/Baseline resp. status   CV/Hemodynamics: Uneventful            Sign Out: Acceptable CV status; No obvious hypovolemia; No obvious fluid overload   Other NRE: NONE   DID A NON-ROUTINE EVENT OCCUR? No           Last vitals:  Vitals Value Taken Time   /62 12/19/22 1315   Temp 36.8  C (98.3  F) 12/19/22 1315   Pulse 118 12/19/22 1328   Resp 24 12/19/22 1328   SpO2 96 % 12/19/22 1322   Vitals shown include unvalidated device data.    Electronically Signed By: Emanuel Traylor MD  December 19, 2022  1:30 PM

## 2022-12-28 ENCOUNTER — VIRTUAL VISIT (OUTPATIENT)
Dept: PULMONOLOGY | Facility: CLINIC | Age: 72
End: 2022-12-28
Payer: COMMERCIAL

## 2022-12-28 DIAGNOSIS — J39.8 TRACHEOBRONCHOMALACIA: Primary | ICD-10-CM

## 2022-12-28 DIAGNOSIS — R09.82 POST-NASAL DRIP: ICD-10-CM

## 2022-12-28 DIAGNOSIS — J45.20 MILD INTERMITTENT ASTHMA WITHOUT COMPLICATION: ICD-10-CM

## 2022-12-28 RX ORDER — MOMETASONE FUROATE AND FORMOTEROL FUMARATE DIHYDRATE 200; 5 UG/1; UG/1
2 AEROSOL RESPIRATORY (INHALATION) 2 TIMES DAILY
Qty: 8.8 G | Refills: 11 | Status: SHIPPED | OUTPATIENT
Start: 2022-12-28

## 2022-12-28 RX ORDER — ALBUTEROL SULFATE 0.83 MG/ML
2.5 SOLUTION RESPIRATORY (INHALATION) EVERY 6 HOURS PRN
Qty: 90 ML | Refills: 11 | Status: SHIPPED | OUTPATIENT
Start: 2022-12-28

## 2022-12-28 RX ORDER — IPRATROPIUM BROMIDE 21 UG/1
2 SPRAY, METERED NASAL EVERY 12 HOURS
Qty: 30 ML | Refills: 11 | Status: SHIPPED | OUTPATIENT
Start: 2022-12-28

## 2022-12-28 NOTE — LETTER
"    12/28/2022         RE: Ana Garland  [address]  [place] [facility] [address]        Dear Colleague,    Thank you for referring your patient, Ana Garland, to the [facility]. Please see a copy of my visit note below.    Ana is a 72 year old who is being evaluated via a billable telephone visit.      What phone number would you like to be contacted at? [phone]  How would you like to obtain your AVS? Mail a copy  {PROVIDER LOCATION On-site should be selected for visits conducted from your clinic location or adjoining [facility] hospital, academic office, or other nearby [facility] building. Off-site should be selected for all other provider locations, including home:[id number]}  Distant Location (provider location):  {virtual location provider:[id number]}  Phone call duration: *** minutes    This is a follow-up discussion after Ana's bronchoscopy last week.    This was done for tracheobronchomalacia.  On her dynamic CT she had an area of narrowing that was being considered for stent placement possible surgery.  On my evaluation she had a \"saber-sheath\" type defect with transverse narrowing of some degree but no AP narrowing.  This is not consistent with tracheobronchomalacia that would be amenable to stenting or surgery.    We followed up to discuss these recommendations given that the patient was emerging from moderate sedation last week.    Unfortunately the patient is having quite a bit of difficulty with sedation and sleepiness.  Sleeping \"20 hours a day\".  Reportedly in the past when she was hypothyroid she was like this and her TSH is elevated now so her primary care doctor is evaluating this.    As I view her metabolic panels from previous it does not look like this is untreated sleep apnea leading to CO2 narcosis.    We discussed her management plan going forward    Treat asthma or asthma type syndrome with a combined daily controller inhaler.  Dulera sent in.  Air duo would be " an option if Dulera is not covered well.  Albuterol as needed    I would recommend pulmonary rehab for her once her sleepiness issue is better.    Manage postnasal drip as needed.  If she continues to have hoarse voice or paroxysmal tightness in her throat she will need to be reevaluated by ENT.    If she has any further dysphagia she should have further follow-up with GI or ENT because of her oral phase dysphagia.    Weight control will be helpful.  She is so short of breath that she cannot tolerate any exercise.  Hopefully with continued improvement of her other issues we can get her into pulmonary rehab.    Sleep follow-up.  Given her excessive daytime sleepiness she should follow-up with a sleep doctor managing her CPAP machine.    She will follow-up on these issues locally.    At this point she has had a very detailed evaluation for tracheobronchomalacia.  She does have some degree of dynamic collapse but it is not something that is fixable with surgery or stenting.  The management plan is as above.    I am available for any questions.    25 minutes spent on this telephone visit today.        Again, thank you for allowing me to participate in the care of your patient.        Sincerely,        Tre Decker MD

## 2022-12-28 NOTE — PROGRESS NOTES
"Ana is a 72 year old who is being evaluated via a billable telephone visit.      What phone number would you like to be contacted at? [phone]  How would you like to obtain your AVS? Mail a copy    Distant Location (provider location):  On-site  Phone call duration: 25 minutes    This is a follow-up discussion after Ana's bronchoscopy last week.    This was done for tracheobronchomalacia.  On her dynamic CT she had an area of narrowing that was being considered for stent placement possible surgery.  On my evaluation she had a \"saber-sheath\" type defect with transverse narrowing of some degree but no AP narrowing.  This is not consistent with tracheobronchomalacia that would be amenable to stenting or surgery.    We followed up to discuss these recommendations given that the patient was emerging from moderate sedation last week.    Unfortunately the patient is having quite a bit of difficulty with sedation and sleepiness.  Sleeping \"20 hours a day\".  Reportedly in the past when she was hypothyroid she was like this and her TSH is elevated now so her primary care doctor is evaluating this.    As I view her metabolic panels from previous it does not look like this is untreated sleep apnea leading to CO2 narcosis.    We discussed her management plan going forward    Treat asthma or asthma type syndrome with a combined daily controller inhaler.  Dulera sent in.  Air duo would be an option if Dulera is not covered well.  Albuterol as needed    I would recommend pulmonary rehab for her once her sleepiness issue is better.    Manage postnasal drip as needed.  If she continues to have hoarse voice or paroxysmal tightness in her throat she will need to be reevaluated by ENT.    If she has any further dysphagia she should have further follow-up with GI or ENT because of her oral phase dysphagia.    Weight control will be helpful.  She is so short of breath that she cannot tolerate any exercise.  Hopefully with " continued improvement of her other issues we can get her into pulmonary rehab.    Sleep follow-up.  Given her excessive daytime sleepiness she should follow-up with a sleep doctor managing her CPAP machine.    She will follow-up on these issues locally.    At this point she has had a very detailed evaluation for tracheobronchomalacia.  She does have some degree of dynamic collapse but it is not something that is fixable with surgery or stenting.  The management plan is as above.    I am available for any questions.    25 minutes spent on this telephone visit today.

## 2022-12-28 NOTE — PROCEDURES
INTERVENTIONAL PULMONOLOGY       Procedure(s):    A flexible bronchoscopy  Airway exam      Indication:  tracheobronchomalacia    Attending of Record:  Tre Decker MD    Interventional Pulmonary Fellow   None    Trainees Present:   None     Medications:    See flowsheet    Sedation Time:   Total sedation time was Choose Duration: 16 minutes of continuous bedside 1:1 monitoring.    Time Out:  Performed    The patient's medical record has been reviewed.  The indication for the procedure was reviewed.  The necessary history and physical examination was performed and reviewed.  The risks, benefits and alternatives of the procedure were discussed with the the patient in detail and she had the opportunity to ask questions.  I discussed in particular the potential complications including risks of minor or life-threatening bleeding and/or infection, respiratory failure, vocal cord trauma / paralysis, pneumothorax, and discomfort. Sedation risks were also discussed including abnormal heart rhythms, low blood pressure, and respiratory failure. All questions were answered to the best of my ability.  Verbal and written informed consent was obtained.  The proposed procedure and the patient's identification were verified prior to the procedure by the physician and the nurse, respiratory therapist.    The patient was assessed for the adequacy for the procedure and to receive medications.   Mental Status:  Alert and oriented x 3  Airway examination:  Class II (Complete visualization of uvula)  Pulmonary:  Decreased breathsound throughout  CV:  RRR, no murmurs or gallops  ASA Grade:  (II)  Mild systemic disease    After clinical evaluation and reviewing the indication, risks, alternatives and benefits of the procedure the patient was deemed to be in satisfactory condition to undergo the procedure.      Immediately before administration of medications the patient was re-assessed for adequacy to receive sedatives including the  heart rate, respiratory rate, mental status, oxygen saturation, blood pressure and adequacy of pulmonary ventilation. These same parameters were continuously monitored throughout the procedure.    A Tuberculosis risk assessment was performed:  The patient has no known RISK of Tuberculosis    The procedure was performed in a negative airflow room: Yes    Maneuvers / Procedure:      A Flexible  bronchoscope was used for the procedure. The flexible bronchoscope was inserted through the mouth observing the epiglottis and posterior pharyngeal structures. The VC were anesthetized with 1% and 4% lidocaine. The scope was then advanced throught the cords and into the trachea.      Airway Examination: A complete airway examination was performed from the distal trachea to the subsegmental level in each lobe of both lungs.  Pertinent findings include sabre sheath deformity but no evidence of significant tracheobronchomalacia in trachea at site of narrowing on CT.  Significant malacia in smaller distal airways - bilateral lower lobes         Any disposable equipment was visually inspected and deemed to be intact immediately post procedure.      Relevant Pictures      Recommendations:     -->  Follow up with me in clinic    Tre Decker MD

## 2022-12-28 NOTE — LETTER
"    12/28/2022         RE: Ana Garland  [address]  [place] [address]        Dear Colleague,    Thank you for referring your patient, Ana Garland, to the [facility]. Please see a copy of my visit note below.    Ana is a 72 year old who is being evaluated via a billable telephone visit.      What phone number would you like to be contacted at? [phone]  How would you like to obtain your AVS? Mail a copy    Distant Location (provider location):  On-site  Phone call duration: 25 minutes    This is a follow-up discussion after Ana's bronchoscopy last week.    This was done for tracheobronchomalacia.  On her dynamic CT she had an area of narrowing that was being considered for stent placement possible surgery.  On my evaluation she had a \"saber-sheath\" type defect with transverse narrowing of some degree but no AP narrowing.  This is not consistent with tracheobronchomalacia that would be amenable to stenting or surgery.    We followed up to discuss these recommendations given that the patient was emerging from moderate sedation last week.    Unfortunately the patient is having quite a bit of difficulty with sedation and sleepiness.  Sleeping \"20 hours a day\".  Reportedly in the past when she was hypothyroid she was like this and her TSH is elevated now so her primary care doctor is evaluating this.    As I view her metabolic panels from previous it does not look like this is untreated sleep apnea leading to CO2 narcosis.    We discussed her management plan going forward    Treat asthma or asthma type syndrome with a combined daily controller inhaler.  Dulera sent in.  Air duo would be an option if Dulera is not covered well.  Albuterol as needed    I would recommend pulmonary rehab for her once her sleepiness issue is better.    Manage postnasal drip as needed.  If she continues to have hoarse voice or paroxysmal tightness in her throat she will need to be " reevaluated by ENT.    If she has any further dysphagia she should have further follow-up with GI or ENT because of her oral phase dysphagia.    Weight control will be helpful.  She is so short of breath that she cannot tolerate any exercise.  Hopefully with continued improvement of her other issues we can get her into pulmonary rehab.    Sleep follow-up.  Given her excessive daytime sleepiness she should follow-up with a sleep doctor managing her CPAP machine.    She will follow-up on these issues locally.    At this point she has had a very detailed evaluation for tracheobronchomalacia.  She does have some degree of dynamic collapse but it is not something that is fixable with surgery or stenting.  The management plan is as above.    I am available for any questions.    25 minutes spent on this telephone visit today.        Again, thank you for allowing me to participate in the care of your patient.        Sincerely,        Tre Decker MD

## (undated) DEVICE — SYR 30ML SLIP TIP W/O NDL 302833

## (undated) DEVICE — KIT ENDO FIRST STEP DISINFECTANT 200ML W/POUCH EP-4

## (undated) DEVICE — ENDO VALVE BX EVIS MAJ-210

## (undated) DEVICE — ANTIFOG SOLUTION W/FOAM PAD CF-1002

## (undated) DEVICE — LINEN TOWEL PACK X5 5464

## (undated) DEVICE — ENDO VALVE SUCTION BRONCH EVIS MAJ-209

## (undated) DEVICE — TUBING SUCTION 10'X3/16" N510

## (undated) DEVICE — LUBRICANT INST KIT ENDO-LUBE 220-90

## (undated) DEVICE — JELLY LUBRICATING SURGILUBE 2OZ TUBE

## (undated) DEVICE — BOWL STERILE 32OZ DYND50320

## (undated) DEVICE — LABEL MEDICATION SYSTEM 3303-P

## (undated) RX ORDER — METOPROLOL TARTRATE 1 MG/ML
INJECTION, SOLUTION INTRAVENOUS
Status: DISPENSED
Start: 2022-12-19

## (undated) RX ORDER — LIDOCAINE HYDROCHLORIDE 40 MG/ML
INJECTION, SOLUTION RETROBULBAR
Status: DISPENSED
Start: 2022-11-04

## (undated) RX ORDER — GLYCOPYRROLATE 0.2 MG/ML
INJECTION, SOLUTION INTRAMUSCULAR; INTRAVENOUS
Status: DISPENSED
Start: 2022-12-19

## (undated) RX ORDER — ACETAMINOPHEN 500 MG
TABLET ORAL
Status: DISPENSED
Start: 2022-12-19

## (undated) RX ORDER — LIDOCAINE HYDROCHLORIDE 20 MG/ML
SOLUTION OROPHARYNGEAL
Status: DISPENSED
Start: 2022-11-04

## (undated) RX ORDER — LIDOCAINE HYDROCHLORIDE 40 MG/ML
INJECTION, SOLUTION RETROBULBAR
Status: DISPENSED
Start: 2022-12-19

## (undated) RX ORDER — ALBUTEROL SULFATE 0.83 MG/ML
SOLUTION RESPIRATORY (INHALATION)
Status: DISPENSED
Start: 2022-11-04

## (undated) RX ORDER — FENTANYL CITRATE 0.05 MG/ML
INJECTION, SOLUTION INTRAMUSCULAR; INTRAVENOUS
Status: DISPENSED
Start: 2022-11-04

## (undated) RX ORDER — LIDOCAINE HYDROCHLORIDE 10 MG/ML
INJECTION, SOLUTION EPIDURAL; INFILTRATION; INTRACAUDAL; PERINEURAL
Status: DISPENSED
Start: 2022-11-04

## (undated) RX ORDER — LIDOCAINE HYDROCHLORIDE AND EPINEPHRINE 10; 10 MG/ML; UG/ML
INJECTION, SOLUTION INFILTRATION; PERINEURAL
Status: DISPENSED
Start: 2022-11-04

## (undated) RX ORDER — FENTANYL CITRATE 50 UG/ML
INJECTION, SOLUTION INTRAMUSCULAR; INTRAVENOUS
Status: DISPENSED
Start: 2022-12-19